# Patient Record
Sex: FEMALE | URBAN - METROPOLITAN AREA
[De-identification: names, ages, dates, MRNs, and addresses within clinical notes are randomized per-mention and may not be internally consistent; named-entity substitution may affect disease eponyms.]

---

## 2019-11-13 ENCOUNTER — APPOINTMENT (RX ONLY)
Dept: URBAN - METROPOLITAN AREA CLINIC 86 | Facility: CLINIC | Age: 29
Setting detail: DERMATOLOGY
End: 2019-11-13

## 2019-11-13 DIAGNOSIS — L71.8 OTHER ROSACEA: ICD-10-CM | Status: INADEQUATELY CONTROLLED

## 2019-11-13 PROCEDURE — ? COUNSELING

## 2019-11-13 PROCEDURE — ? PRESCRIPTION

## 2019-11-13 PROCEDURE — 99202 OFFICE O/P NEW SF 15 MIN: CPT

## 2019-11-13 PROCEDURE — ? TREATMENT REGIMEN

## 2019-11-13 RX ORDER — IVERMECTIN 10 MG/G
QD CREAM TOPICAL QD
Qty: 1 | Refills: 2 | Status: ERX | COMMUNITY
Start: 2019-11-13

## 2019-11-13 RX ORDER — DOXYCYCLINE HYCLATE 20 MG/1
BID TABLET, FILM COATED ORAL TWICE A DAY
Qty: 60 | Refills: 2 | Status: ERX | COMMUNITY
Start: 2019-11-13

## 2019-11-13 RX ADMIN — IVERMECTIN QD: 10 CREAM TOPICAL at 20:31

## 2019-11-13 RX ADMIN — DOXYCYCLINE HYCLATE BID: 20 TABLET, FILM COATED ORAL at 20:31

## 2019-11-13 ASSESSMENT — LOCATION DETAILED DESCRIPTION DERM
LOCATION DETAILED: LEFT CENTRAL MALAR CHEEK
LOCATION DETAILED: RIGHT CENTRAL MALAR CHEEK

## 2019-11-13 ASSESSMENT — LOCATION SIMPLE DESCRIPTION DERM
LOCATION SIMPLE: LEFT CHEEK
LOCATION SIMPLE: RIGHT CHEEK

## 2019-11-13 ASSESSMENT — LOCATION ZONE DERM: LOCATION ZONE: FACE

## 2019-11-13 ASSESSMENT — SEVERITY ASSESSMENT OVERALL AMONG ALL PATIENTS
IN YOUR EXPERIENCE, AMONG ALL PATIENTS YOU HAVE SEEN WITH THIS CONDITION, HOW SEVERE IS THIS PATIENT'S CONDITION?: MODERATE TO SEVERE

## 2019-11-13 NOTE — PROCEDURE: MIPS QUALITY
Quality 110: Preventive Care And Screening: Influenza Immunization: Influenza immunization was not ordered or administered, reason not given
Quality 111:Pneumonia Vaccination Status For Older Adults: Pneumococcal Vaccination not Administered or Previously Received, Reason not Otherwise Specified
Quality 130: Documentation Of Current Medications In The Medical Record: Current Medications Documented
Quality 131: Pain Assessment And Follow-Up: Pain assessment using a standardized tool is documented as negative, no follow-up plan required
Quality 226: Preventive Care And Screening: Tobacco Use: Screening And Cessation Intervention: Patient screened for tobacco use and is an ex/non-smoker
Detail Level: Detailed

## 2019-11-13 NOTE — PROCEDURE: TREATMENT REGIMEN
Initiate Treatment: Doxycycline 20mg bid & Soolantra qd both send to local Pharmacy today
Detail Level: Zone
Otc Regimen: Neutrogena hydro boost gel cream bid and Neutrogena hydro boost cleanser daily
Samples Given: Neutrogena hydro boost gel cream, Neutrogena hydro boost cleanser, Soolantra,

## 2020-01-14 ENCOUNTER — RX ONLY (OUTPATIENT)
Age: 30
Setting detail: RX ONLY
End: 2020-01-14

## 2020-01-14 RX ORDER — IVERMECTIN 10 MG/G
BID CREAM TOPICAL BID
Qty: 1 | Refills: 2 | Status: CANCELLED
Stop reason: CLARIF

## 2020-01-14 RX ORDER — METRONIDAZOLE 7.5 MG/G
BID CREAM TOPICAL BID
Qty: 1 | Refills: 2 | Status: ERX | COMMUNITY
Start: 2020-01-14